# Patient Record
Sex: MALE | Race: WHITE | NOT HISPANIC OR LATINO | Employment: PART TIME | URBAN - METROPOLITAN AREA
[De-identification: names, ages, dates, MRNs, and addresses within clinical notes are randomized per-mention and may not be internally consistent; named-entity substitution may affect disease eponyms.]

---

## 2022-06-15 ENCOUNTER — HOSPITAL ENCOUNTER (EMERGENCY)
Facility: CLINIC | Age: 42
Discharge: HOME | End: 2022-06-15
Attending: FAMILY MEDICINE | Admitting: FAMILY MEDICINE
Payer: COMMERCIAL

## 2022-06-15 VITALS
HEIGHT: 70 IN | WEIGHT: 190 LBS | BODY MASS INDEX: 27.2 KG/M2 | SYSTOLIC BLOOD PRESSURE: 154 MMHG | OXYGEN SATURATION: 98 % | HEART RATE: 86 BPM | RESPIRATION RATE: 20 BRPM | TEMPERATURE: 98.3 F | DIASTOLIC BLOOD PRESSURE: 88 MMHG

## 2022-06-15 DIAGNOSIS — M54.2 NECK PAIN: ICD-10-CM

## 2022-06-15 LAB
ALBUMIN SERPL-MCNC: 3.8 G/DL (ref 3.4–5)
ALP SERPL-CCNC: 89 U/L (ref 40–150)
ALT SERPL W P-5'-P-CCNC: 25 U/L (ref 0–70)
ANION GAP SERPL CALCULATED.3IONS-SCNC: 2 MMOL/L (ref 3–14)
AST SERPL W P-5'-P-CCNC: 16 U/L (ref 0–45)
BASOPHILS # BLD AUTO: 0.1 10E3/UL (ref 0–0.2)
BASOPHILS NFR BLD AUTO: 1 %
BILIRUB SERPL-MCNC: 0.1 MG/DL (ref 0.2–1.3)
BUN SERPL-MCNC: 22 MG/DL (ref 7–30)
CALCIUM SERPL-MCNC: 8.1 MG/DL (ref 8.5–10.1)
CHLORIDE BLD-SCNC: 111 MMOL/L (ref 94–109)
CO2 SERPL-SCNC: 29 MMOL/L (ref 20–32)
CREAT SERPL-MCNC: 0.96 MG/DL (ref 0.66–1.25)
CRP SERPL-MCNC: <2.9 MG/L (ref 0–8)
EOSINOPHIL # BLD AUTO: 0.3 10E3/UL (ref 0–0.7)
EOSINOPHIL NFR BLD AUTO: 3 %
ERYTHROCYTE [DISTWIDTH] IN BLOOD BY AUTOMATED COUNT: 12.5 % (ref 10–15)
ERYTHROCYTE [SEDIMENTATION RATE] IN BLOOD BY WESTERGREN METHOD: 5 MM/HR (ref 0–15)
GFR SERPL CREATININE-BSD FRML MDRD: >90 ML/MIN/1.73M2
GLUCOSE BLD-MCNC: 139 MG/DL (ref 70–99)
HCT VFR BLD AUTO: 39 % (ref 40–53)
HGB BLD-MCNC: 13.7 G/DL (ref 13.3–17.7)
IMM GRANULOCYTES # BLD: 0 10E3/UL
IMM GRANULOCYTES NFR BLD: 0 %
LYMPHOCYTES # BLD AUTO: 4.3 10E3/UL (ref 0.8–5.3)
LYMPHOCYTES NFR BLD AUTO: 46 %
MCH RBC QN AUTO: 32.2 PG (ref 26.5–33)
MCHC RBC AUTO-ENTMCNC: 35.1 G/DL (ref 31.5–36.5)
MCV RBC AUTO: 92 FL (ref 78–100)
MONOCYTES # BLD AUTO: 0.6 10E3/UL (ref 0–1.3)
MONOCYTES NFR BLD AUTO: 6 %
NEUTROPHILS # BLD AUTO: 4.1 10E3/UL (ref 1.6–8.3)
NEUTROPHILS NFR BLD AUTO: 44 %
NRBC # BLD AUTO: 0 10E3/UL
NRBC BLD AUTO-RTO: 0 /100
PLATELET # BLD AUTO: 233 10E3/UL (ref 150–450)
POTASSIUM BLD-SCNC: 3.9 MMOL/L (ref 3.4–5.3)
PROT SERPL-MCNC: 6.6 G/DL (ref 6.8–8.8)
RBC # BLD AUTO: 4.25 10E6/UL (ref 4.4–5.9)
SODIUM SERPL-SCNC: 142 MMOL/L (ref 133–144)
WBC # BLD AUTO: 9.3 10E3/UL (ref 4–11)

## 2022-06-15 PROCEDURE — 96375 TX/PRO/DX INJ NEW DRUG ADDON: CPT | Performed by: FAMILY MEDICINE

## 2022-06-15 PROCEDURE — 250N000011 HC RX IP 250 OP 636: Performed by: FAMILY MEDICINE

## 2022-06-15 PROCEDURE — 99285 EMERGENCY DEPT VISIT HI MDM: CPT | Performed by: FAMILY MEDICINE

## 2022-06-15 PROCEDURE — 36415 COLL VENOUS BLD VENIPUNCTURE: CPT | Performed by: FAMILY MEDICINE

## 2022-06-15 PROCEDURE — 96374 THER/PROPH/DIAG INJ IV PUSH: CPT | Performed by: FAMILY MEDICINE

## 2022-06-15 PROCEDURE — 86140 C-REACTIVE PROTEIN: CPT | Performed by: FAMILY MEDICINE

## 2022-06-15 PROCEDURE — 99285 EMERGENCY DEPT VISIT HI MDM: CPT | Mod: 25 | Performed by: FAMILY MEDICINE

## 2022-06-15 PROCEDURE — 85652 RBC SED RATE AUTOMATED: CPT | Performed by: FAMILY MEDICINE

## 2022-06-15 PROCEDURE — 85004 AUTOMATED DIFF WBC COUNT: CPT | Performed by: FAMILY MEDICINE

## 2022-06-15 PROCEDURE — 80053 COMPREHEN METABOLIC PANEL: CPT | Performed by: FAMILY MEDICINE

## 2022-06-15 RX ORDER — KETOROLAC TROMETHAMINE 30 MG/ML
30 INJECTION, SOLUTION INTRAMUSCULAR; INTRAVENOUS ONCE
Status: COMPLETED | OUTPATIENT
Start: 2022-06-15 | End: 2022-06-15

## 2022-06-15 RX ORDER — HYDROCODONE BITARTRATE AND ACETAMINOPHEN 5; 325 MG/1; MG/1
1 TABLET ORAL EVERY 6 HOURS PRN
Qty: 10 TABLET | Refills: 0 | Status: SHIPPED | OUTPATIENT
Start: 2022-06-15 | End: 2022-06-18

## 2022-06-15 RX ORDER — HYDROMORPHONE HYDROCHLORIDE 1 MG/ML
0.5 INJECTION, SOLUTION INTRAMUSCULAR; INTRAVENOUS; SUBCUTANEOUS ONCE
Status: COMPLETED | OUTPATIENT
Start: 2022-06-15 | End: 2022-06-15

## 2022-06-15 RX ORDER — CYCLOBENZAPRINE HCL 10 MG
5-10 TABLET ORAL 3 TIMES DAILY PRN
Qty: 21 TABLET | Refills: 0 | Status: SHIPPED | OUTPATIENT
Start: 2022-06-15 | End: 2022-06-22

## 2022-06-15 RX ADMIN — HYDROMORPHONE HYDROCHLORIDE 0.5 MG: 1 INJECTION, SOLUTION INTRAMUSCULAR; INTRAVENOUS; SUBCUTANEOUS at 04:48

## 2022-06-15 RX ADMIN — KETOROLAC TROMETHAMINE 30 MG: 30 INJECTION, SOLUTION INTRAMUSCULAR at 04:46

## 2022-06-15 NOTE — DISCHARGE INSTRUCTIONS
There are no signs of ongoing osteomyelitis based on your blood work today.  You received Dilaudid for pain.  Take Flexeril and Norco as needed for breakthrough pain.  Please establish care with a primary care provider as soon as possible.  You would benefit from treatment for your anxiety.

## 2022-06-15 NOTE — ED PROVIDER NOTES
"                                                            [facility] ED Provider Note   Patient: Dionicio Stark  MRN #:  [id number]  Date of Visit: Blanche 15, 2022    CC:     Chief Complaint   Patient presents with     Neck Pain     HPI:  Dionicio Stark is a 42 year old male who presented to the emergency department with 3-day history of severe left-sided neck pain, with a history of previous episodes of osteomyelitis of the cervical spine dating back to 2007.  Patient states that his recent flareup might have been triggered by stress.  His neighbors were extremely loud and he found himself tensing up.  He typically has a flareup a couple of times a year, and he was apparently at urgent care back in March, treated with a few days of Flexeril and oxycodone.  He tried to go to the [facility] emergency department yesterday, but sat in the waiting room for 5 hours.  Patient rates his pain level 10 out of 10.  He has not had any recent fevers or chills.  There is no pain radiating into the upper extremities.    Problem List:  There are no problems to display for this patient.      History reviewed. No pertinent past medical history.    MEDS: cyclobenzaprine (FLEXERIL) 10 MG tablet  HYDROcodone-acetaminophen (NORCO) 5-325 MG tablet        ALLERGIES:  No Known Allergies    History reviewed. No pertinent surgical history.    Social History     Tobacco Use     Smoking status: Former Smoker     Smokeless tobacco: Never Used         Review of Systems   Except as noted in HPI, all other systems were reviewed and are negative    Physical Exam     Vitals were reviewed  Patient Vitals for the past 8 hrs:   BP Temp Temp src Pulse Resp SpO2 Height Weight   06/15/22 0620 (!) 154/88 -- -- 86 20 -- -- --   06/15/22 0408 (!) 154/104 98.3  F (36.8  C) Oral 106 20 98 % 1.778 m (5' 10\") 86.2 kg (190 lb)     GENERAL APPEARANCE: Alert, anxious, moderate to severe distress  FACE: normal facies  EYES: Pupils are " equal  HENT: normal external exam  NECK: Tenderness along the left paracervical trapezius region  RESP: normal respiratory effort; clear breath sounds bilaterally  CV: regular rate and rhythm; no significant murmurs, gallops or rubs  ABD: soft, no tenderness; no rebound or guarding; bowel sounds are normal  EXT: No calf tenderness or pitting edema  SKIN: no worrisome rash  NEURO: no facial droop; no focal deficits, full motion in upper extremities.  Strength is intact        Available Lab/Imaging Results     Results for orders placed or performed during the hospital encounter of 06/15/22 (from the past 24 hour(s))   CBC with platelets differential    Narrative    The following orders were created for panel order CBC with platelets differential.  Procedure                               Abnormality         Status                     ---------                               -----------         ------                     CBC with platelets and d...[[id number]]  Abnormal            Final result                 Please view results for these tests on the individual orders.   Comprehensive metabolic panel   Result Value Ref Range    Sodium 142 133 - 144 mmol/L    Potassium 3.9 3.4 - 5.3 mmol/L    Chloride 111 (H) 94 - 109 mmol/L    Carbon Dioxide (CO2) 29 20 - 32 mmol/L    Anion Gap 2 (L) 3 - 14 mmol/L    Urea Nitrogen 22 7 - 30 mg/dL    Creatinine 0.96 0.66 - 1.25 mg/dL    Calcium 8.1 (L) 8.5 - 10.1 mg/dL    Glucose 139 (H) 70 - 99 mg/dL    Alkaline Phosphatase 89 40 - 150 U/L    AST 16 0 - 45 U/L    ALT 25 0 - 70 U/L    Protein Total 6.6 (L) 6.8 - 8.8 g/dL    Albumin 3.8 3.4 - 5.0 g/dL    Bilirubin Total 0.1 (L) 0.2 - 1.3 mg/dL    GFR Estimate >90 >60 mL/min/1.73m2   CRP inflammation   Result Value Ref Range    CRP Inflammation <2.9 0.0 - 8.0 mg/L   Erythrocyte sedimentation rate auto   Result Value Ref Range    Erythrocyte Sedimentation Rate 5 0 - 15 mm/hr   CBC with platelets and differential   Result Value Ref Range     WBC Count 9.3 4.0 - 11.0 10e3/uL    RBC Count 4.25 (L) 4.40 - 5.90 10e6/uL    Hemoglobin 13.7 13.3 - 17.7 g/dL    Hematocrit 39.0 (L) 40.0 - 53.0 %    MCV 92 78 - 100 fL    MCH 32.2 26.5 - 33.0 pg    MCHC 35.1 31.5 - 36.5 g/dL    RDW 12.5 10.0 - 15.0 %    Platelet Count 233 150 - 450 10e3/uL    % Neutrophils 44 %    % Lymphocytes 46 %    % Monocytes 6 %    % Eosinophils 3 %    % Basophils 1 %    % Immature Granulocytes 0 %    NRBCs per 100 WBC 0 <1 /100    Absolute Neutrophils 4.1 1.6 - 8.3 10e3/uL    Absolute Lymphocytes 4.3 0.8 - 5.3 10e3/uL    Absolute Monocytes 0.6 0.0 - 1.3 10e3/uL    Absolute Eosinophils 0.3 0.0 - 0.7 10e3/uL    Absolute Basophils 0.1 0.0 - 0.2 10e3/uL    Absolute Immature Granulocytes 0.0 <=0.4 10e3/uL    Absolute NRBCs 0.0 10e3/uL                Impression     Final diagnoses:   Neck pain         ED Course & Medical Decision Making   Dionicio Stark is a 42 year old male who presented to the emergency department with acute exacerbation of neck pain.  Patient states that he had osteomyelitis dating back to 2007.  He has had occasional flareups a couple times a year, sometimes brought on by stress.  Patient denies any fever or chills, and does not have any radicular symptoms emanating from the cervical spine.  He has no trunk or lower extremity symptoms.  He reports no recent injuries, and there are no other red flag warning signs.  Patient was last seen at urgent care for the same problem back in March and treated with Flexeril and oxycodone.  Vital signs reveal a temp of 98.3, blood pressure 154/104, heart rate of 106, respiratory rate of 20 with 98% oxygen saturation.  Exam reveals tenderness along the left paracervical and trapezius region but no paresthesias or weakness into the upper extremities.  Patient was able to move his head from side to side.  There is no significant nuchal rigidity.  Laboratory work-up reveals a normal white blood count, CRP and sed rate.  Comprehensive  metabolic panel was normal except for nonfasting glucose of 139.  There is low suspicion for recurrent osteomyelitis.  Patient does not have any other signs of cervical radiculopathy.  Patient received a single dose of Dilaudid 0.5 mg and Toradol 30 mg IV.  He was asleep in the room when we came back to check on him.  Patient has apparently improved.  Please see discharge instructions below.    Medications   HYDROmorphone (PF) (DILAUDID) injection 0.5 mg (0.5 mg Intravenous Given 6/15/22 0448)   ketorolac (TORADOL) injection 30 mg (30 mg Intravenous Given 6/15/22 0446)            Written after-visit summary and instructions were given at the time of discharge.    Follow up Plan:   Establish care with a primary care provider    Schedule an appointment as soon as possible for a visit       [facility] Emergency Dept  [address] [place] [address] [address] [place] [id number] [phone]    If symptoms worsen      Discharge Instructions:   There are no signs of ongoing osteomyelitis based on your blood work today.  You received Dilaudid for pain.  Take Flexeril and Norco as needed for breakthrough pain.  Please establish care with a primary care provider as soon as possible.  You would benefit from treatment for your anxiety.       Disclaimer: This note consists of words and symbols derived from keyboarding and dictation using voice recognition software.  As a result, there may be errors that have gone undetected.  Please consider this when interpreting information found in this note.       Abundio Rodas MD  06/15/22 9571

## 2022-10-20 ENCOUNTER — HOSPITAL ENCOUNTER (EMERGENCY)
Facility: CLINIC | Age: 42
Discharge: HOME | End: 2022-10-20
Attending: EMERGENCY MEDICINE | Admitting: EMERGENCY MEDICINE
Payer: COMMERCIAL

## 2022-10-20 VITALS
OXYGEN SATURATION: 96 % | HEART RATE: 96 BPM | TEMPERATURE: 97.7 F | SYSTOLIC BLOOD PRESSURE: 142 MMHG | RESPIRATION RATE: 18 BRPM | DIASTOLIC BLOOD PRESSURE: 84 MMHG

## 2022-10-20 DIAGNOSIS — G89.29 CHRONIC NECK PAIN: ICD-10-CM

## 2022-10-20 DIAGNOSIS — M54.2 CHRONIC NECK PAIN: ICD-10-CM

## 2022-10-20 PROCEDURE — 99285 EMERGENCY DEPT VISIT HI MDM: CPT | Performed by: EMERGENCY MEDICINE

## 2022-10-20 PROCEDURE — 250N000011 HC RX IP 250 OP 636: Performed by: EMERGENCY MEDICINE

## 2022-10-20 PROCEDURE — 96372 THER/PROPH/DIAG INJ SC/IM: CPT | Performed by: EMERGENCY MEDICINE

## 2022-10-20 RX ORDER — GABAPENTIN 300 MG/1
300 CAPSULE ORAL 2 TIMES DAILY
COMMUNITY
Start: 2022-09-13

## 2022-10-20 RX ORDER — KETOROLAC TROMETHAMINE 30 MG/ML
60 INJECTION, SOLUTION INTRAMUSCULAR; INTRAVENOUS ONCE
Status: COMPLETED | OUTPATIENT
Start: 2022-10-20 | End: 2022-10-20

## 2022-10-20 RX ORDER — CYCLOBENZAPRINE HCL 10 MG
10 TABLET ORAL 3 TIMES DAILY PRN
COMMUNITY
Start: 2022-08-02

## 2022-10-20 RX ORDER — ORPHENADRINE CITRATE 30 MG/ML
60 INJECTION INTRAMUSCULAR; INTRAVENOUS EVERY 12 HOURS
Status: DISCONTINUED | OUTPATIENT
Start: 2022-10-20 | End: 2022-10-20 | Stop reason: HOSPADM

## 2022-10-20 RX ADMIN — HYDROMORPHONE HYDROCHLORIDE 1 MG: 1 INJECTION, SOLUTION INTRAMUSCULAR; INTRAVENOUS; SUBCUTANEOUS at 18:17

## 2022-10-20 RX ADMIN — KETOROLAC TROMETHAMINE 60 MG: 30 INJECTION, SOLUTION INTRAMUSCULAR at 17:14

## 2022-10-20 RX ADMIN — ORPHENADRINE CITRATE 60 MG: 30 INJECTION INTRAMUSCULAR; INTRAVENOUS at 17:14

## 2022-10-20 ASSESSMENT — ACTIVITIES OF DAILY LIVING (ADL): ADLS_ACUITY_SCORE: 35

## 2022-10-20 NOTE — ED PROVIDER NOTES
History     Chief Complaint   Patient presents with     Neck Pain     HPI  Dionicio Stark is a 42 year old male who presents with exacerbation of chronic neck pain.  Patient states he has had problems with neck for a long period of time and had previous history of osteomyelitis.  He has had ongoing issues with neck with a recent exacerbation about a week ago.  States he was can to manipulate or cracking his neck to see if that would help with his discomfort when the experience left-sided pain that has persisted and worsened.  Denies specific headache.  No visual change, change in hearing, difficulty speech or swallowing.  He denies chest pain or shortness of breath.  He has not been nauseated or vomiting.  Has had no fever or chills.  No radicular arm pain or weakness.  He is on Neurontin for his chronic neck pain but when asked if he has a radiculopathy he is on sure what this means.  He is followed by family medicine through the [facility] for his med management.  Apparently stays at [facility].  He is on gabapentin 300 twice daily and on Flexeril twice daily as needed for muscle spasm.  Was on citalopram for anxiety without improvement so switched to Prozac.  He denies any muscle weakness or paresthesias.  No red flag warnings.  No recent fall or injury.    Allergies:  Allergies   Allergen Reactions     No Known Allergies        Problem List:    There are no problems to display for this patient.       Past Medical History:    No past medical history on file.    Past Surgical History:    No past surgical history on file.    Family History:    No family history on file.    Social History:  Marital Status:  Single [1]  Social History     Tobacco Use     Smoking status: Former     Smokeless tobacco: Never        Medications:    cyclobenzaprine (FLEXERIL) 10 MG tablet  FLUoxetine (PROZAC) 20 MG capsule  gabapentin (NEURONTIN) 300 MG capsule          Review of Systems all other systems are  reviewed and are negative.    Physical Exam   BP: (!) 142/84  Pulse: 96  Temp: 97.7  F (36.5  C)  Resp: 16  SpO2: 96 %      Physical Exam alert male in mild to moderate distress.  He is able to sit up without assistance from the exam cart.  He is able to move down to the end of the cart without difficulty.  HEENT shows no facial swelling or asymmetry.  His pupils are equal react light accommodation.  Extract motions are intact.  Speech is clear.  Neck is supple without meningismus but does have tenderness of the left trapezius, paraspinous musculature, and strap muscle.  No radicular arm pain or weakness with manipulation of the neck.  No midline bony tenderness or crepitus of the spine.  Patient is able ambulate without assistance.    ED Course                 Procedures              Critical Care time:  none               No results found for this or any previous visit (from the past 24 hour(s)).    Medications   orphenadrine (NORFLEX) injection 60 mg (60 mg Intramuscular Given 10/20/22 1714)   ketorolac (TORADOL) injection 60 mg (60 mg Intramuscular Given 10/20/22 1714)   HYDROmorphone (DILAUDID) injection 1 mg (1 mg Intramuscular Given 10/20/22 1817)     Patient is given IM Norflex and Toradol.  At 6:10 PM on recheck patient still has pain about 6-7 on 1-10 scale.  One-time dose of Dilaudid is ordered.  Assessments & Plan (with Medical Decision Making)   Dionicio Stark is a 42 year old male who presents with exacerbation of chronic neck pain.  Patient states he has had problems with neck for a long period of time and had previous history of osteomyelitis.  He has had ongoing issues with neck with a recent exacerbation about a week ago.  States he was can to manipulate or cracking his neck to see if that would help with his discomfort when the experience left-sided pain that has persisted and worsened.  Denies specific headache.  No visual change, change in hearing, difficulty speech or swallowing.  He denies  chest pain or shortness of breath.  He has not been nauseated or vomiting.  Has had no fever or chills.  No radicular arm pain or weakness.  He is on Neurontin for his chronic neck pain but when asked if he has a radiculopathy he is on sure what this means.  He is followed by family medicine through the [facility] for his med management.  Apparently stays at [facility].  He is on gabapentin 300 twice daily and on Flexeril twice daily as needed for muscle spasm.  Was on citalopram for anxiety without improvement so switched to Prozac.  He denies any muscle weakness or paresthesias.  No red flag warnings.  No recent fall or injury.  On presentation patient was afebrile and vitally stable.  He is in moderate distress.  Seem to be more muscular in nature on his left neck as described above.  No radicular arm pain or weakness.  Gait is normal.  No red flag warnings.  Given Norflex and Toradol.  1 additional dose of Dilaudid is ordered.  Patient requested an MRI.  With no recent injury or red flag warnings could not obtain an emergent MRI this evening as that was not available.  He then requested narcotics for home use but this was declined due to the chronicity of his pain.  He left to talk to his primary doctor regarding this  I have reviewed the nursing notes.    I have reviewed the findings, diagnosis, plan and need for follow up with the patient.       New Prescriptions    No medications on file       Final diagnoses:   Chronic neck pain       10/20/2022   [facility] EMERGENCY DEPT     Manolo Sheridan MD  10/20/22 4066

## 2022-10-20 NOTE — DISCHARGE INSTRUCTIONS
Continue your other meds.  Call your doctor to discuss your concerns and request for an MRI.  Follow-up as needed.

## 2022-10-20 NOTE — ED TRIAGE NOTES
Pt presents with neck pain. Pt states started one week ago. No known injury. But did have a history of osteomyelitis in 2007     Triage Assessment     Row Name 10/20/22 3415       Triage Assessment (Adult)    Airway WDL WDL       Respiratory WDL    Respiratory WDL WDL       Skin Circulation/Temperature WDL    Skin Circulation/Temperature WDL WDL       Cardiac WDL    Cardiac WDL WDL       Peripheral/Neurovascular WDL    Peripheral Neurovascular WDL WDL       Cognitive/Neuro/Behavioral WDL    Cognitive/Neuro/Behavioral WDL WDL

## 2023-04-05 ENCOUNTER — HOSPITAL ENCOUNTER (EMERGENCY)
Facility: CLINIC | Age: 43
Discharge: HOME | End: 2023-04-05
Attending: EMERGENCY MEDICINE | Admitting: EMERGENCY MEDICINE
Payer: COMMERCIAL

## 2023-04-05 VITALS
TEMPERATURE: 97.9 F | BODY MASS INDEX: 27.32 KG/M2 | WEIGHT: 190.4 LBS | SYSTOLIC BLOOD PRESSURE: 141 MMHG | OXYGEN SATURATION: 96 % | RESPIRATION RATE: 16 BRPM | HEART RATE: 113 BPM | DIASTOLIC BLOOD PRESSURE: 104 MMHG

## 2023-04-05 DIAGNOSIS — N45.1 EPIDIDYMITIS: ICD-10-CM

## 2023-04-05 DIAGNOSIS — N50.812 PAIN IN LEFT TESTICLE: ICD-10-CM

## 2023-04-05 DIAGNOSIS — R30.0 DYSURIA: ICD-10-CM

## 2023-04-05 LAB
ALBUMIN UR-MCNC: NEGATIVE MG/DL
APPEARANCE UR: CLEAR
BILIRUB UR QL STRIP: NEGATIVE
COLOR UR AUTO: YELLOW
GLUCOSE UR STRIP-MCNC: NEGATIVE MG/DL
HGB UR QL STRIP: ABNORMAL
KETONES UR STRIP-MCNC: NEGATIVE MG/DL
LEUKOCYTE ESTERASE UR QL STRIP: ABNORMAL
MUCOUS THREADS #/AREA URNS LPF: PRESENT /LPF
NITRATE UR QL: NEGATIVE
PH UR STRIP: 5 [PH] (ref 5–7)
RBC URINE: 1 /HPF
SP GR UR STRIP: 1.03 (ref 1–1.03)
UROBILINOGEN UR STRIP-MCNC: NORMAL MG/DL
WBC URINE: 33 /HPF

## 2023-04-05 PROCEDURE — 81001 URINALYSIS AUTO W/SCOPE: CPT | Performed by: EMERGENCY MEDICINE

## 2023-04-05 PROCEDURE — 99283 EMERGENCY DEPT VISIT LOW MDM: CPT | Performed by: EMERGENCY MEDICINE

## 2023-04-05 PROCEDURE — 87086 URINE CULTURE/COLONY COUNT: CPT | Performed by: EMERGENCY MEDICINE

## 2023-04-05 RX ORDER — LEVOFLOXACIN 500 MG/1
500 TABLET, FILM COATED ORAL DAILY
Qty: 10 TABLET | Refills: 0 | Status: SHIPPED | OUTPATIENT
Start: 2023-04-05 | End: 2023-04-15

## 2023-04-05 ASSESSMENT — ACTIVITIES OF DAILY LIVING (ADL): ADLS_ACUITY_SCORE: 33

## 2023-04-05 NOTE — DISCHARGE INSTRUCTIONS
I have given you antibiotics for what I presume to be epididymitis.  As we discussed, because I have not been able to examine your testicles nor obtain an ultrasound, we could be missing other more worrisome diseases.  If your pain does not go away with the antibiotics, you need further work-up into this.  You can return here or follow-up with a primary care doctor for this work-up.

## 2023-04-05 NOTE — ED PROVIDER NOTES
History     chief complaint  HPI  Patient is a 43-year-old gentleman without a significant past medical history presenting for left testicle pain and dysuria.  Feels like he is starting and stopping his urine and seems like he has to pee more often.  He is also noticed left testicle pain.  No trauma.  It is in the inferior and posterior portion on his testicle that hurts.  No twisting.  No swelling.  No nausea, vomiting, fevers, chills.  No concerns for STIs.    Review of Systems:  All organ systems below were reviewed and are negative unless indicated in the HPI.    Constitutional  Eyes  ENT  Respiratory  Cardiovascular  Gastrointestinal  Genitourinary  Musculoskeletal  Skin  Neuro    Allergies:  Allergies   Allergen Reactions     No Known Allergies        Problem List:    There are no problems to display for this patient.       Past Medical History:    History reviewed. No pertinent past medical history.    Past Surgical History:    History reviewed. No pertinent surgical history.    Family History:    No family history on file.    Medications:    levofloxacin (LEVAQUIN) 500 MG tablet  cyclobenzaprine (FLEXERIL) 10 MG tablet  FLUoxetine (PROZAC) 20 MG capsule  gabapentin (NEURONTIN) 300 MG capsule          Physical Exam   BP: (!) 141/104  Pulse: 113  Temp: 97.9  F (36.6  C)  Resp: 16  Weight: 86.4 kg (190 lb 6.4 oz)  SpO2: 96 %    Gen: Vital signs reviewed  Eyes: Sclera white, pupils round  ENT: External ears and nares normal  Card: Appears well-perfused  Resp: No respiratory distress.   Extremities: Without obvious deformity  : Deferred based on patient preference  Skin: Dry  Neuro: Alert.    ED Course        Procedures           Results for orders placed or performed during the hospital encounter of 04/05/23 (from the past 24 hour(s))   UA with Microscopic reflex to Culture    Specimen: Urine, Midstream   Result Value Ref Range    Color Urine Yellow Colorless, Straw, Light Yellow, Yellow    Appearance  Urine Clear Clear    Glucose Urine Negative Negative mg/dL    Bilirubin Urine Negative Negative    Ketones Urine Negative Negative mg/dL    Specific Gravity Urine 1.027 1.003 - 1.035    Blood Urine Small (A) Negative    pH Urine 5.0 5.0 - 7.0    Protein Albumin Urine Negative Negative mg/dL    Urobilinogen Urine Normal Normal, 2.0 mg/dL    Nitrite Urine Negative Negative    Leukocyte Esterase Urine Trace (A) Negative    Mucus Urine Present (A) None Seen /LPF    RBC Urine 1 <=2 /HPF    WBC Urine 33 (H) <=5 /HPF    Narrative    Urine Culture ordered based on laboratory criteria       Medications - No data to display      Consultations:  None    Social Determinants of Health:  Present with his girlfriend    Assessments & Plan (with Medical Decision Making)       I have reviewed the nursing notes.    I have reviewed the findings, diagnosis, plan and need for follow up with the patient.      Medical Decision Making  On arrival, patient is mildly hypertensive and tachycardic.  He does appear anxious about the whole encounter.  He does not want me to examine his testicles.  He does not want an ultrasound.  He does understand the risks of missing more serious pathology such as malignancy.  He does have trace leukocyte esterase.  Sent for culture on the urine.  I will treat him empirically for epididymitis given his age and description of the pain on his testicle.  He agrees that if the antibiotics do not help him he will return for further work-up into his testicular pain.  Discharged home in stable condition.    Final diagnoses:   Epididymitis   Pain in left testicle   Dysuria         Jg Merritt M.D.   [facility] Emergency Department     Jg Merritt MD  04/05/23 2016

## 2023-04-07 LAB — BACTERIA UR CULT: NO GROWTH
